# Patient Record
Sex: MALE | URBAN - METROPOLITAN AREA
[De-identification: names, ages, dates, MRNs, and addresses within clinical notes are randomized per-mention and may not be internally consistent; named-entity substitution may affect disease eponyms.]

---

## 2022-01-24 ENCOUNTER — APPOINTMENT (OUTPATIENT)
Dept: URBAN - METROPOLITAN AREA CLINIC 263 | Age: 32
Setting detail: DERMATOLOGY
End: 2022-01-25

## 2022-01-24 DIAGNOSIS — L30.9 DERMATITIS, UNSPECIFIED: ICD-10-CM

## 2022-01-24 DIAGNOSIS — L29.8 OTHER PRURITUS: ICD-10-CM

## 2022-01-24 PROCEDURE — OTHER COUNSELING: OTHER

## 2022-01-24 PROCEDURE — OTHER MIPS QUALITY: OTHER

## 2022-01-24 PROCEDURE — 36415 COLL VENOUS BLD VENIPUNCTURE: CPT

## 2022-01-24 PROCEDURE — OTHER VENIPUNCTURE: OTHER

## 2022-01-24 PROCEDURE — 99204 OFFICE O/P NEW MOD 45 MIN: CPT | Mod: 25

## 2022-01-24 PROCEDURE — OTHER ORDER TESTS: OTHER

## 2022-01-24 PROCEDURE — OTHER ADDITIONAL NOTES: OTHER

## 2022-01-24 NOTE — HPI: SKIN LESION
How Severe Is Your Skin Lesion?: severe
Has Your Skin Lesion Been Treated?: not been treated
Is This A New Presentation, Or A Follow-Up?: Skin Lesions
Additional History: Pt stated he has visited two other providers in the past and they have tried to treat areas also Biopsy has been performed in the past came back cleared but still has these issues, pt stated went to the beach a few years ago and was bitten by something and this has been ongoing since that stared into something small now has spread over body.\\n\\nHe states that the first provider he saw biopsied the lesions and identified a rare bacterial infection that required IV antibiotics. He was send to infectious disease and completed 3 months of IV antibiotics. However, the itching and new lesions persisted. He states that he experiences a crawling sensation, itching and a pulling or tightness around his joints. The second provider he saw also did a biopsy and stated that the results were benign but the patient was unclear as to what the diagnosis was. He states that he is always in pain or itching. He states that he has tried multiple antibiotics (for folliculitis), prednisone, antihistamines, topical steroids, oral yeast medications and gabapentin but none of the medications have provided him relief. He does not notice that anything makes his skin feel better or worse. He has a history of thyroid disease but has not needed to take medication recently, as his levels have been stable. His last bloodwork was 6-7 months ago. He recently went to the ER and they referred him here.
Which Family Member (Optional)?: Grandfather pt mother father

## 2022-01-24 NOTE — PROCEDURE: MIPS QUALITY
Quality 226: Preventive Care And Screening: Tobacco Use: Screening And Cessation Intervention: Patient screened for tobacco use, is a smoker AND did not received Cessation Counseling for Unknown Reasons
Quality 110: Preventive Care And Screening: Influenza Immunization: Influenza Immunization not Administered because Patient Refused.
Detail Level: Detailed
Quality 431: Preventive Care And Screening: Unhealthy Alcohol Use - Screening: Patient not identified as an unhealthy alcohol user when screened for unhealthy alcohol use using a systematic screening method
Quality 130: Documentation Of Current Medications In The Medical Record: Current Medications Documented

## 2022-01-24 NOTE — PROCEDURE: ADDITIONAL NOTES
Render Risk Assessment In Note?: no
Additional Notes: I explained that updated bloodwork to screen for anemia, thyroid status and autoimmune disease may be helpful. He has previously tried all treatments that I feel would be valuable to him from a skin perspective. I recommended that he see a rheumatologist for further evaluation of his joint pain.
Detail Level: Simple